# Patient Record
Sex: FEMALE | Race: WHITE | NOT HISPANIC OR LATINO | URBAN - METROPOLITAN AREA
[De-identification: names, ages, dates, MRNs, and addresses within clinical notes are randomized per-mention and may not be internally consistent; named-entity substitution may affect disease eponyms.]

---

## 2021-02-17 ENCOUNTER — E-VISIT (OUTPATIENT)
Dept: FAMILY MEDICINE CLINIC | Facility: TELEHEALTH | Age: 41
End: 2021-02-17

## 2021-02-17 ENCOUNTER — TELEMEDICINE (OUTPATIENT)
Dept: FAMILY MEDICINE CLINIC | Facility: TELEHEALTH | Age: 41
End: 2021-02-17

## 2021-02-17 VITALS — TEMPERATURE: 99 F

## 2021-02-17 DIAGNOSIS — J06.9 UPPER RESPIRATORY TRACT INFECTION, UNSPECIFIED TYPE: Primary | ICD-10-CM

## 2021-02-17 DIAGNOSIS — J32.9 SINUSITIS, UNSPECIFIED CHRONICITY, UNSPECIFIED LOCATION: ICD-10-CM

## 2021-02-17 DIAGNOSIS — H66.91 ACUTE EAR INFECTION, RIGHT: Primary | ICD-10-CM

## 2021-02-17 PROCEDURE — 99203 OFFICE O/P NEW LOW 30 MIN: CPT | Performed by: NURSE PRACTITIONER

## 2021-02-17 PROCEDURE — 99422 OL DIG E/M SVC 11-20 MIN: CPT | Performed by: NURSE PRACTITIONER

## 2021-02-17 RX ORDER — METHYLPREDNISOLONE 4 MG/1
TABLET ORAL
Qty: 1 EACH | Refills: 0 | Status: SHIPPED | OUTPATIENT
Start: 2021-02-17 | End: 2021-04-05

## 2021-02-17 RX ORDER — FLUTICASONE PROPIONATE 50 MCG
2 SPRAY, SUSPENSION (ML) NASAL DAILY
Qty: 18.2 ML | Refills: 0 | Status: SHIPPED | OUTPATIENT
Start: 2021-02-17 | End: 2021-04-05

## 2021-02-17 RX ORDER — AMOXICILLIN AND CLAVULANATE POTASSIUM 875; 125 MG/1; MG/1
1 TABLET, FILM COATED ORAL 2 TIMES DAILY
Qty: 14 TABLET | Refills: 0 | Status: SHIPPED | OUTPATIENT
Start: 2021-02-17 | End: 2021-02-24

## 2021-02-17 NOTE — PATIENT INSTRUCTIONS

## 2021-02-17 NOTE — PATIENT INSTRUCTIONS
Recommend Covid testing. Isolation from others until on antibiotic for 48 hours and fever is down without the use of fever-reducing medication.     Sinusitis, Adult  Sinusitis is soreness and swelling (inflammation) of your sinuses. Sinuses are hollow spaces in the bones around your face. They are located:  · Around your eyes.  · In the middle of your forehead.  · Behind your nose.  · In your cheekbones.  Your sinuses and nasal passages are lined with a fluid called mucus. Mucus drains out of your sinuses. Swelling can trap mucus in your sinuses. This lets germs (bacteria, virus, or fungus) grow, which leads to infection. Most of the time, this condition is caused by a virus.  What are the causes?  This condition is caused by:  · Allergies.  · Asthma.  · Germs.  · Things that block your nose or sinuses.  · Growths in the nose (nasal polyps).  · Chemicals or irritants in the air.  · Fungus (rare).  What increases the risk?  You are more likely to develop this condition if:  · You have a weak body defense system (immune system).  · You do a lot of swimming or diving.  · You use nasal sprays too much.  · You smoke.  What are the signs or symptoms?  The main symptoms of this condition are pain and a feeling of pressure around the sinuses. Other symptoms include:  · Stuffy nose (congestion).  · Runny nose (drainage).  · Swelling and warmth in the sinuses.  · Headache.  · Toothache.  · A cough that may get worse at night.  · Mucus that collects in the throat or the back of the nose (postnasal drip).  · Being unable to smell and taste.  · Being very tired (fatigue).  · A fever.  · Sore throat.  · Bad breath.  How is this diagnosed?  This condition is diagnosed based on:  · Your symptoms.  · Your medical history.  · A physical exam.  · Tests to find out if your condition is short-term (acute) or long-term (chronic). Your doctor may:  ? Check your nose for growths (polyps).  ? Check your sinuses using a tool that has a light  (endoscope).  ? Check for allergies or germs.  ? Do imaging tests, such as an MRI or CT scan.  How is this treated?  Treatment for this condition depends on the cause and whether it is short-term or long-term.  · If caused by a virus, your symptoms should go away on their own within 10 days. You may be given medicines to relieve symptoms. They include:  ? Medicines that shrink swollen tissue in the nose.  ? Medicines that treat allergies (antihistamines).  ? A spray that treats swelling of the nostrils.   ? Rinses that help get rid of thick mucus in your nose (nasal saline washes).  · If caused by bacteria, your doctor may wait to see if you will get better without treatment. You may be given antibiotic medicine if you have:  ? A very bad infection.  ? A weak body defense system.  · If caused by growths in the nose, you may need to have surgery.  Follow these instructions at home:  Medicines  · Take, use, or apply over-the-counter and prescription medicines only as told by your doctor. These may include nasal sprays.  · If you were prescribed an antibiotic medicine, take it as told by your doctor. Do not stop taking the antibiotic even if you start to feel better.  Hydrate and humidify    · Drink enough water to keep your pee (urine) pale yellow.  · Use a cool mist humidifier to keep the humidity level in your home above 50%.  · Breathe in steam for 10-15 minutes, 3-4 times a day, or as told by your doctor. You can do this in the bathroom while a hot shower is running.  · Try not to spend time in cool or dry air.  Rest  · Rest as much as you can.  · Sleep with your head raised (elevated).  · Make sure you get enough sleep each night.  General instructions    · Put a warm, moist washcloth on your face 3-4 times a day, or as often as told by your doctor. This will help with discomfort.  · Wash your hands often with soap and water. If there is no soap and water, use hand .  · Do not smoke. Avoid being around  people who are smoking (secondhand smoke).  · Keep all follow-up visits as told by your doctor. This is important.  Contact a doctor if:  · You have a fever.  · Your symptoms get worse.  · Your symptoms do not get better within 10 days.  Get help right away if:  · You have a very bad headache.  · You cannot stop throwing up (vomiting).  · You have very bad pain or swelling around your face or eyes.  · You have trouble seeing.  · You feel confused.  · Your neck is stiff.  · You have trouble breathing.  Summary  · Sinusitis is swelling of your sinuses. Sinuses are hollow spaces in the bones around your face.  · This condition is caused by tissues in your nose that become inflamed or swollen. This traps germs. These can lead to infection.  · If you were prescribed an antibiotic medicine, take it as told by your doctor. Do not stop taking it even if you start to feel better.  · Keep all follow-up visits as told by your doctor. This is important.  This information is not intended to replace advice given to you by your health care provider. Make sure you discuss any questions you have with your health care provider.  Document Revised: 05/20/2019 Document Reviewed: 05/20/2019  Elsevier Patient Education © 2020 [employer]

## 2021-02-17 NOTE — PROGRESS NOTES
Chief Complaint  Sinusitis (Ear pain, sore throat, fever, chills, headache, bodyaches)    Subjective          Kira Adam presents to [facility]  Sore throat, fever, chills, headache, bodyaches, earpain for a couple of days. She reports living in a group home setting and was exposed to two other females that were reported to have pneumonia. Both started with the same symptoms that she is currently having. There has been no reports of Covid in the facility and she has not been tested since entering the previous facility 2 months ago. Her temperature last night was reported to be 101 but returned to normal with tylenol and alternating with Aleve. She reports chills then sweats when fever comes down. She denies cough or chest congestion.    Sinusitis  This is a new problem. The current episode started in the past 7 days. The problem has been gradually worsening since onset. The maximum temperature recorded prior to her arrival was 101 - 101.9 F. Associated symptoms include chills, ear pain, headaches, sinus pressure, a sore throat and swollen glands. Pertinent negatives include no coughing or shortness of breath. Past treatments include acetaminophen. Improvement on treatment: temp down to normal with tylenol.       Review of Systems   Constitutional: Positive for chills and fever.   HENT: Positive for ear pain, postnasal drip, sinus pressure, sore throat and swollen glands.    Respiratory: Negative for cough, shortness of breath and wheezing.    Cardiovascular: Negative for chest pain.   Musculoskeletal: Positive for myalgias. Negative for gait problem.     Objective   Vital Signs:   Temp 99 °F (37.2 °C) (Oral)     Physical Exam  Constitutional:       General: She is not in acute distress.     Appearance: She is ill-appearing.   HENT:      Nose: Nose normal.   Pulmonary:      Effort: Pulmonary effort is normal.   Neurological:      Mental Status: She is alert.   Psychiatric:          Speech: Speech normal.        Result Review :                 Assessment and Plan    Diagnoses and all orders for this visit:    1. Acute ear infection, right (Primary)    2. Sinusitis, unspecified chronicity, unspecified location  -     amoxicillin-clavulanate (AUGMENTIN) 875-125 MG per tablet; Take 1 tablet by mouth 2 (Two) Times a Day for 7 days.  Dispense: 14 tablet; Refill: 0  -     methylPREDNISolone (MEDROL) 4 MG dose pack; Take as directed on package instructions.  Dispense: 1 each; Refill: 0    Recommend Covid testing. Isolation from others until on antibiotic for 48 hours and fever is down without the use of fever-reducing medication. See patient instructions.      Follow Up   No follow-ups on file.  Patient was given instructions and counseling regarding her condition or for health maintenance advice. Please see specific information pulled into the AVS if appropriate.

## 2021-02-17 NOTE — PROGRESS NOTES
Kira Adam    1980  [id number]    I have reviewed the e-Visit questionnaire and patient's answers, my assessment and plan are as follows:      HPI  Kira Adam is a 40 y.o. with a 1 day history of occasional dry cough, low-grade fever <100.4, nasal congestion, sore throat, swollen glands, headache, ear pain, body aches, fatigue.  She denies shortness of breath, loss of taste or smell    Review of Systems - Negative except symptoms listed in HPI      Diagnoses and all orders for this visit:    1. Upper respiratory tract infection, unspecified type (Primary)  -     Chlorcyclizine-Pseudoephed 25-60 MG tablet; Take 1 tablet by mouth 3 (Three) Times a Day As Needed (nasal congestion).  Dispense: 42 tablet; Refill: 0  -     fluticasone (FLONASE) 50 MCG/ACT nasal spray; 2 sprays into the nostril(s) as directed by provider Daily.  Dispense: 18.2 mL; Refill: 0  -     COVID-19 PCR, [facility], NP SWAB IN [facility]AL TRANSPORT MEDIA 24-30 HR TAT - Swab, Nasopharynx; Future    --URI vs potential COVID-19 infection  --will treat for viral URI and test for COVID  --recommend quarantine x 10 days until symptoms improve/resolve and no fever x 24 hours without fever-reducing medications        Any medications prescribed have been sent electronically to   [id number] - [place], [place] - [address] [place] [address] - [phone]  - [phone] [address] [address]  Phone: [phone] Fax: [phone]      Time Documentation  Counseled patient  Counseling topics: diagnosis, treatment options, return instructions and quarantine/testing  Total encounter time: counseling time more than 50% of visit: 20 minutes        Tabitha Li, ARIES  02/17/21  11:20 EST

## 2021-04-05 ENCOUNTER — LAB (OUTPATIENT)
Dept: LAB | Facility: HOSPITAL | Age: 41
End: 2021-04-05

## 2021-04-05 ENCOUNTER — HOSPITAL ENCOUNTER (OUTPATIENT)
Dept: GENERAL RADIOLOGY | Facility: HOSPITAL | Age: 41
Discharge: HOME | End: 2021-04-05

## 2021-04-05 ENCOUNTER — OFFICE VISIT (OUTPATIENT)
Dept: INTERNAL MEDICINE | Facility: CLINIC | Age: 41
End: 2021-04-05

## 2021-04-05 VITALS
TEMPERATURE: 97.8 F | BODY MASS INDEX: 22.54 KG/M2 | DIASTOLIC BLOOD PRESSURE: 66 MMHG | HEART RATE: 78 BPM | OXYGEN SATURATION: 99 % | SYSTOLIC BLOOD PRESSURE: 100 MMHG | WEIGHT: 143.6 LBS | HEIGHT: 67 IN

## 2021-04-05 DIAGNOSIS — Z11.3 SCREEN FOR STD (SEXUALLY TRANSMITTED DISEASE): ICD-10-CM

## 2021-04-05 DIAGNOSIS — R22.1 NECK MASS: ICD-10-CM

## 2021-04-05 DIAGNOSIS — K04.7 DENTAL INFECTION: ICD-10-CM

## 2021-04-05 DIAGNOSIS — R61 NIGHT SWEATS: Primary | ICD-10-CM

## 2021-04-05 DIAGNOSIS — G89.29 CHRONIC PAIN OF LEFT ANKLE: ICD-10-CM

## 2021-04-05 DIAGNOSIS — Z13.1 SCREENING FOR DIABETES MELLITUS: ICD-10-CM

## 2021-04-05 DIAGNOSIS — R61 NIGHT SWEATS: ICD-10-CM

## 2021-04-05 DIAGNOSIS — M25.572 CHRONIC PAIN OF LEFT ANKLE: ICD-10-CM

## 2021-04-05 DIAGNOSIS — Z13.220 SCREENING CHOLESTEROL LEVEL: ICD-10-CM

## 2021-04-05 DIAGNOSIS — Z12.4 SCREENING FOR MALIGNANT NEOPLASM OF CERVIX: ICD-10-CM

## 2021-04-05 LAB
ALBUMIN SERPL-MCNC: 4.1 G/DL (ref 3.5–5.2)
ALBUMIN/GLOB SERPL: 1.7 G/DL
ALP SERPL-CCNC: 57 U/L (ref 39–117)
ALT SERPL W P-5'-P-CCNC: 18 U/L (ref 1–33)
ANION GAP SERPL CALCULATED.3IONS-SCNC: 9.2 MMOL/L (ref 5–15)
AST SERPL-CCNC: 18 U/L (ref 1–32)
B-HCG UR QL: NEGATIVE
BASOPHILS # BLD AUTO: 0.05 10*3/MM3 (ref 0–0.2)
BASOPHILS NFR BLD AUTO: 0.8 % (ref 0–1.5)
BILIRUB BLD-MCNC: NEGATIVE MG/DL
BILIRUB SERPL-MCNC: <0.2 MG/DL (ref 0–1.2)
BUN SERPL-MCNC: 12 MG/DL (ref 6–20)
BUN/CREAT SERPL: 19 (ref 7–25)
CALCIUM SPEC-SCNC: 8.9 MG/DL (ref 8.6–10.5)
CHLORIDE SERPL-SCNC: 105 MMOL/L (ref 98–107)
CHOLEST SERPL-MCNC: 130 MG/DL (ref 0–200)
CLARITY, POC: ABNORMAL
CO2 SERPL-SCNC: 28.8 MMOL/L (ref 22–29)
COLOR UR: YELLOW
CREAT SERPL-MCNC: 0.63 MG/DL (ref 0.57–1)
DEPRECATED RDW RBC AUTO: 42.2 FL (ref 37–54)
EOSINOPHIL # BLD AUTO: 0.1 10*3/MM3 (ref 0–0.4)
EOSINOPHIL NFR BLD AUTO: 1.5 % (ref 0.3–6.2)
ERYTHROCYTE [DISTWIDTH] IN BLOOD BY AUTOMATED COUNT: 12.8 % (ref 12.3–15.4)
GFR SERPL CREATININE-BSD FRML MDRD: 106 ML/MIN/1.73
GLOBULIN UR ELPH-MCNC: 2.4 GM/DL
GLUCOSE SERPL-MCNC: 71 MG/DL (ref 65–99)
GLUCOSE UR STRIP-MCNC: NEGATIVE MG/DL
HBA1C MFR BLD: 5.25 % (ref 4.8–5.6)
HCT VFR BLD AUTO: 40.9 % (ref 34–46.6)
HDLC SERPL-MCNC: 52 MG/DL (ref 40–60)
HGB BLD-MCNC: 13.4 G/DL (ref 12–15.9)
IMM GRANULOCYTES # BLD AUTO: 0.02 10*3/MM3 (ref 0–0.05)
IMM GRANULOCYTES NFR BLD AUTO: 0.3 % (ref 0–0.5)
INTERNAL NEGATIVE CONTROL: NEGATIVE
INTERNAL POSITIVE CONTROL: POSITIVE
KETONES UR QL: NEGATIVE
LDLC SERPL CALC-MCNC: 60 MG/DL (ref 0–100)
LDLC/HDLC SERPL: 1.13 {RATIO}
LEUKOCYTE EST, POC: NEGATIVE
LYMPHOCYTES # BLD AUTO: 2.68 10*3/MM3 (ref 0.7–3.1)
LYMPHOCYTES NFR BLD AUTO: 40.8 % (ref 19.6–45.3)
Lab: NORMAL
MCH RBC QN AUTO: 29.5 PG (ref 26.6–33)
MCHC RBC AUTO-ENTMCNC: 32.8 G/DL (ref 31.5–35.7)
MCV RBC AUTO: 90.1 FL (ref 79–97)
MONOCYTES # BLD AUTO: 0.49 10*3/MM3 (ref 0.1–0.9)
MONOCYTES NFR BLD AUTO: 7.5 % (ref 5–12)
NEUTROPHILS NFR BLD AUTO: 3.23 10*3/MM3 (ref 1.7–7)
NEUTROPHILS NFR BLD AUTO: 49.1 % (ref 42.7–76)
NITRITE UR-MCNC: NEGATIVE MG/ML
NRBC BLD AUTO-RTO: 0 /100 WBC (ref 0–0.2)
PH UR: 6.5 [PH] (ref 5–8)
PLATELET # BLD AUTO: 376 10*3/MM3 (ref 140–450)
PMV BLD AUTO: 10.1 FL (ref 6–12)
POTASSIUM SERPL-SCNC: 4.7 MMOL/L (ref 3.5–5.2)
PROT SERPL-MCNC: 6.5 G/DL (ref 6–8.5)
PROT UR STRIP-MCNC: NEGATIVE MG/DL
RBC # BLD AUTO: 4.54 10*6/MM3 (ref 3.77–5.28)
RBC # UR STRIP: ABNORMAL /UL
SODIUM SERPL-SCNC: 143 MMOL/L (ref 136–145)
SP GR UR: 1.01 (ref 1–1.03)
TRIGL SERPL-MCNC: 95 MG/DL (ref 0–150)
TSH SERPL DL<=0.05 MIU/L-ACNC: 0.58 UIU/ML (ref 0.27–4.2)
UROBILINOGEN UR QL: NORMAL
VLDLC SERPL-MCNC: 18 MG/DL (ref 5–40)
WBC # BLD AUTO: 6.57 10*3/MM3 (ref 3.4–10.8)

## 2021-04-05 PROCEDURE — 87798 DETECT AGENT NOS DNA AMP: CPT | Performed by: PHYSICIAN ASSISTANT

## 2021-04-05 PROCEDURE — 36415 COLL VENOUS BLD VENIPUNCTURE: CPT

## 2021-04-05 PROCEDURE — 81025 URINE PREGNANCY TEST: CPT | Performed by: PHYSICIAN ASSISTANT

## 2021-04-05 PROCEDURE — 80050 GENERAL HEALTH PANEL: CPT | Performed by: PHYSICIAN ASSISTANT

## 2021-04-05 PROCEDURE — 86803 HEPATITIS C AB TEST: CPT | Performed by: PHYSICIAN ASSISTANT

## 2021-04-05 PROCEDURE — 87661 TRICHOMONAS VAGINALIS AMPLIF: CPT | Performed by: PHYSICIAN ASSISTANT

## 2021-04-05 PROCEDURE — 87801 DETECT AGNT MULT DNA AMPLI: CPT | Performed by: PHYSICIAN ASSISTANT

## 2021-04-05 PROCEDURE — 87491 CHLMYD TRACH DNA AMP PROBE: CPT | Performed by: PHYSICIAN ASSISTANT

## 2021-04-05 PROCEDURE — 80061 LIPID PANEL: CPT | Performed by: PHYSICIAN ASSISTANT

## 2021-04-05 PROCEDURE — 99214 OFFICE O/P EST MOD 30 MIN: CPT | Performed by: PHYSICIAN ASSISTANT

## 2021-04-05 PROCEDURE — 86592 SYPHILIS TEST NON-TREP QUAL: CPT | Performed by: PHYSICIAN ASSISTANT

## 2021-04-05 PROCEDURE — G0432 EIA HIV-1/HIV-2 SCREEN: HCPCS | Performed by: PHYSICIAN ASSISTANT

## 2021-04-05 PROCEDURE — 73610 X-RAY EXAM OF ANKLE: CPT

## 2021-04-05 PROCEDURE — 83036 HEMOGLOBIN GLYCOSYLATED A1C: CPT | Performed by: PHYSICIAN ASSISTANT

## 2021-04-05 PROCEDURE — 87591 N.GONORRHOEAE DNA AMP PROB: CPT | Performed by: PHYSICIAN ASSISTANT

## 2021-04-05 RX ORDER — MULTIPLE VITAMINS W/ MINERALS TAB 9MG-400MCG
1 TAB ORAL DAILY
COMMUNITY

## 2021-04-05 RX ORDER — AMOXICILLIN 875 MG/1
875 TABLET, COATED ORAL 2 TIMES DAILY
Qty: 20 TABLET | Refills: 0 | Status: SHIPPED | OUTPATIENT
Start: 2021-04-05 | End: 2021-06-21

## 2021-04-05 RX ORDER — CHOLECALCIFEROL (VITAMIN D3) 125 MCG
10 CAPSULE ORAL NIGHTLY
COMMUNITY
End: 2021-07-09

## 2021-04-05 NOTE — PROGRESS NOTES
Chief Complaint  Establish Care (new patient), Joint Swelling (may need a referral to ortho), Mass (has a lump in her throat on left side), Night Sweats (has night sweats sx x 2 weeks), and lab (would like labs today)    Subjective          History of Present Illness  Kira Adam presents to [facility] to establish care.  Was living in [place] but moved here 2 mo ago to be in [facility] for long term program. Hx of drug abuse but no IV drug abuse.   Is here for blood work and STD screen, had neg STD screen 2 months ago after possible exposure to syphilis.  Everything was negative. No current signs of stds, no sores/rashes, no vaginal discharge, is on her period currently.    Mass in neck:  Has been there for almost 20 years, no symptoms. She has seen a dr before for it and they said was not a goiter. She has always had normal thyroid level.  She does not have any trouble swallowing or pain with it.  Does not feel like it has gotten larger.     Night sweats:  No weight loss or fevers, wakes up soaked a few times a week for the last few weeks. Not sure if has an infection, teeth are in bad shape, has a fear of the dentist.  Is going to be seeing a dentist soon as part of the program she is in.  Attributes it to possible nightmares.  Is not interested in being on any kind of medication for sleep or nightmares.     Foot Pain:  Left talus fracture over 4 years ago.  She had surgery but has had recurrent ankle swelling and pain since then.  Was told one of the screws was working its way out.  She does have a lot of varicose veins on that area and intermittent swelling of her leg over the last several years, worse with activity.  Does decrease at night.  Does not have significant pain or swelling in her calf, it is mostly just around her left ankle where she had the injury.         Review of Systems   Constitutional: Negative for chills, fatigue, fever and unexpected weight loss.    HENT: Positive for dental problem. Negative for congestion, hearing loss, rhinorrhea, sore throat and trouble swallowing.    Eyes: Negative for visual disturbance.   Respiratory: Negative for cough, shortness of breath and wheezing.    Cardiovascular: Positive for leg swelling. Negative for chest pain and palpitations.   Gastrointestinal: Negative for abdominal pain, blood in stool, constipation, diarrhea, nausea and indigestion.   Endocrine: Negative for polydipsia and polyuria.   Genitourinary: Negative for breast discharge, dysuria, frequency and urgency.   Musculoskeletal: Positive for joint swelling. Negative for arthralgias, back pain and myalgias.   Skin: Positive for color change. Negative for rash.   Neurological: Positive for headache. Negative for dizziness, seizures, syncope and confusion.   Hematological: Does not bruise/bleed easily.   Psychiatric/Behavioral: Negative for sleep disturbance, suicidal ideas and depressed mood. The patient is not nervous/anxious.        The following portions of the patient's history were reviewed and updated as appropriate: allergies, current medications, past family history, past medical history, past social history, past surgical history and problem list.    No Known Allergies  Current Outpatient Medications on File Prior to Visit   Medication Sig Dispense Refill   • CALCIUM PO Take 2 each by mouth Daily.     • diphenhydrAMINE HCl (BENADRYL ALLERGY PO) Take 1 tablet by mouth Every 72 (Seventy-Two) Hours.     • melatonin 5 MG tablet tablet Take 10 mg by mouth Every Night.     • multivitamin with minerals (MULTIVITAMIN ADULT PO) Take 1 tablet by mouth Daily.       No current facility-administered medications on file prior to visit.     New Medications Ordered This Visit   Medications   • amoxicillin (AMOXIL) 875 MG tablet     Sig: Take 1 tablet by mouth 2 (Two) Times a Day.     Dispense:  20 tablet     Refill:  0       Past Medical History:   Diagnosis Date   • Broken  "ankle 2017    left leg   • Pap smear for cervical cancer screening       Past Surgical History:   Procedure Laterality Date   •  SECTION      3 times   • LEG SURGERY  2017    [place] bone restruction      Family History   Problem Relation Age of Onset   • Diabetes Maternal Grandmother       Social History     Socioeconomic History   • Marital status: Single     Spouse name: Not on file   • Number of children: Not on file   • Years of education: Not on file   • Highest education level: Not on file   Tobacco Use   • Smoking status: Current Every Day Smoker     Types: Cigarettes     Start date:    • Smokeless tobacco: Never Used   • Tobacco comment: 6 cigs a day   Substance and Sexual Activity   • Alcohol use: Never   • Drug use: Not Currently   • Sexual activity: Defer        Objective   Vital Signs:   Vitals:    21 0932   BP: 100/66   BP Location: Left arm   Patient Position: Sitting   Pulse: 78   Temp: 97.8 °F (36.6 °C)   TempSrc: Infrared   SpO2: 99%   Weight: 65.1 kg (143 lb 9.6 oz)   Height: 170.2 cm (67\")      Body mass index is 22.49 kg/m².  Physical Exam  Vitals reviewed.   Constitutional:       General: She is not in acute distress.     Appearance: Normal appearance.   HENT:      Head: Normocephalic and atraumatic.      Mouth/Throat:      Mouth: Mucous membranes are moist.      Dentition: Abnormal dentition. Dental caries present.      Pharynx: No oropharyngeal exudate or posterior oropharyngeal erythema.      Comments: Multiple dental caries and decay noted  Eyes:      General: No scleral icterus.     Extraocular Movements: Extraocular movements intact.      Conjunctiva/sclera: Conjunctivae normal.   Neck:      Comments: Anterior soft/mobile neck mass, 1cm  Cardiovascular:      Rate and Rhythm: Normal rate and regular rhythm.      Heart sounds: Normal heart sounds. No murmur heard.     Pulmonary:      Effort: Pulmonary effort is normal. No respiratory distress.      Breath sounds: Normal " breath sounds. No stridor. No wheezing or rhonchi.   Abdominal:      General: Bowel sounds are normal. There is no distension.      Palpations: Abdomen is soft. There is no mass.      Tenderness: There is no abdominal tenderness. There is no right CVA tenderness, left CVA tenderness, guarding or rebound.   Musculoskeletal:         General: Swelling and signs of injury present. Normal range of motion.      Cervical back: Normal range of motion and neck supple. No tenderness.      Right lower leg: No edema.      Left lower leg: Edema present.   Skin:     General: Skin is warm and dry.      Coloration: Skin is not jaundiced.   Neurological:      General: No focal deficit present.      Mental Status: She is alert and oriented to person, place, and time.      Gait: Gait normal.   Psychiatric:         Mood and Affect: Mood normal.         Behavior: Behavior normal.          Result Review :              Results for orders placed or performed in visit on 04/05/21   POC Urinalysis Dipstick, Automated    Specimen: Urine   Result Value Ref Range    Color Yellow Yellow, Straw, Dark Yellow, Soni    Clarity, UA Hazy (A) Clear    Specific Gravity  1.010 1.005 - 1.030    pH, Urine 6.5 5.0 - 8.0    Leukocytes Negative Negative    Nitrite, UA Negative Negative    Protein, POC Negative Negative mg/dL    Glucose, UA Negative Negative, 1000 mg/dL (3+) mg/dL    Ketones, UA Negative Negative    Urobilinogen, UA Normal Normal    Bilirubin Negative Negative    Blood, UA 3+ (A) Negative   POC Pregnancy, Urine    Specimen: Urine   Result Value Ref Range    HCG, Urine, QL Negative Negative    Lot Number [id number]     Internal Positive Control Positive     Internal Negative Control Negative      .     Assessment and Plan    Diagnoses and all orders for this visit:    1. Night sweats (Primary)  Assessment & Plan:  Check labs. Monitor for fevers or wt loss. F/u if sx worsen or has new sx.    Orders:  -     Comprehensive Metabolic Panel; Future  -      CBC Auto Differential; Future  -     POC Urinalysis Dipstick, Automated  -     Cancel: hCG, Quantitative, Pregnancy; Future  -     POC Pregnancy, Urine    2. Dental infection  Assessment & Plan:  Follow up with dentist, tx w/amox    Orders:  -     amoxicillin (AMOXIL) 875 MG tablet; Take 1 tablet by mouth 2 (Two) Times a Day.  Dispense: 20 tablet; Refill: 0    3. Neck mass  Assessment & Plan:  Order u/s, non emergent, check labs    Orders:  -     TSH Rfx On Abnormal To Free T4; Future  -     US Head Neck Soft Tissue; Future    4. Chronic pain of left ankle  Assessment & Plan:  Xray lft ankle, refer to ortho    Orders:  -     Ambulatory Referral to Orthopedic Surgery  -     XR Ankle 3+ View Left; Future    5. Screen for STD (sexually transmitted disease)  -     RPR; Future  -     HIV-1/O/2 Ag/Ab w Reflex; Future  -     Hepatitis C Antibody; Future  -     NuSwab VG+ - Swab, Vagina; Future    6. Screening for diabetes mellitus  -     Hemoglobin A1c; Future    7. Screening cholesterol level  -     Lipid Panel; Future    8. Screening for malignant neoplasm of cervix  -     Ambulatory Referral to Gynecology        Follow Up   Return in about 4 weeks (around 5/3/2021) for night sweats, neck mass.    Follow up if symptoms worsen or persist or has new or concerning symptoms, go to ER for severe symptoms.   Reviewed common medication effects and side effects and to report side effects immediately, the patient expressed good understanding.  Encouraged medication compliance and the importance of keeping scheduled follow up appointments with me and any other providers  If labs or images are ordered we will contact you with the results within the next week.  If you have not heard from us after a week please call our office to inquire about the results.   Patient was given instructions and counseling regarding her condition or for health maintenance advice. Please see specific information pulled into the AVS if appropriate.      Maude Og PA-C    * Please note that portions of this note may have been completed with a voice recognition program. Efforts were made to edit the dictation but occasionally words are erroneously transcribed.

## 2021-04-06 LAB
HCV AB SER DONR QL: NORMAL
HIV1+2 AB SER QL: NORMAL
RPR SER QL: NORMAL

## 2021-04-07 ENCOUNTER — TELEPHONE (OUTPATIENT)
Dept: INTERNAL MEDICINE | Facility: CLINIC | Age: 41
End: 2021-04-07

## 2021-04-07 NOTE — TELEPHONE ENCOUNTER
----- Message from Maude Og PA-C sent at 4/6/2021 10:04 PM EDT -----  Labs all look good. STD screening all negative. Ankle Xray looked good as well, it says the screws are in place. Should follow up with ortho for ankle pain (referral placed)

## 2021-04-08 ENCOUNTER — TELEPHONE (OUTPATIENT)
Dept: INTERNAL MEDICINE | Facility: CLINIC | Age: 41
End: 2021-04-08

## 2021-04-08 LAB
A VAGINAE DNA VAG QL NAA+PROBE: ABNORMAL SCORE
BVAB2 DNA VAG QL NAA+PROBE: ABNORMAL SCORE
C ALBICANS DNA VAG QL NAA+PROBE: NEGATIVE
C GLABRATA DNA VAG QL NAA+PROBE: NEGATIVE
C TRACH DNA VAG QL NAA+PROBE: NEGATIVE
MEGA1 DNA VAG QL NAA+PROBE: ABNORMAL SCORE
N GONORRHOEA DNA VAG QL NAA+PROBE: NEGATIVE
T VAGINALIS DNA VAG QL NAA+PROBE: NEGATIVE

## 2021-04-09 ENCOUNTER — DOCUMENTATION (OUTPATIENT)
Dept: INTERNAL MEDICINE | Facility: CLINIC | Age: 41
End: 2021-04-09

## 2021-04-09 RX ORDER — METRONIDAZOLE 500 MG/1
500 TABLET ORAL 2 TIMES DAILY
Qty: 14 TABLET | Refills: 0 | Status: SHIPPED | OUTPATIENT
Start: 2021-04-09 | End: 2021-04-16

## 2021-04-12 ENCOUNTER — TELEPHONE (OUTPATIENT)
Dept: INTERNAL MEDICINE | Facility: CLINIC | Age: 41
End: 2021-04-12

## 2021-04-12 NOTE — TELEPHONE ENCOUNTER
----- Message from Maude Og PA-C sent at 4/9/2021  8:45 PM EDT -----  Nuswab neg for STDs, did show bacterial vaginosis. I will send in flagyl

## 2021-04-13 NOTE — TELEPHONE ENCOUNTER
Caller: KoryKira chung    Relationship: Self    Best call back number: [phone]    Medication needed:   Requested Prescriptions     Pending Prescriptions Disp Refills   • metroNIDAZOLE (Flagyl) 500 MG tablet 14 tablet 0     Sig: Take 1 tablet by mouth 2 (Two) Times a Day for 7 days.       When do you need the refill by: 04/13/21    What additional details did the patient provide when requesting the medication: PATIENT WAS INFORMED YESTERDAY THAT THE MEDICATION WOULD BE SENT INTO THE PHARMACY. WHEN SHE CONTACTED THEM, THEY SAID THEY NEVER GOT THE SCRIPT. PATIENT IS ASKING TO BE CONTACTED BACK.     Does the patient have less than a 3 day supply:  [x] Yes  [] No    What is the patient's preferred pharmacy: [id number] [place], [place] - [address] [place] [address] [phone] [place] [phone]

## 2021-04-14 RX ORDER — METRONIDAZOLE 500 MG/1
500 TABLET ORAL 2 TIMES DAILY
Qty: 14 TABLET | Refills: 0 | OUTPATIENT
Start: 2021-04-14 | End: 2021-04-21

## 2021-04-14 NOTE — TELEPHONE ENCOUNTER
S/W pharmacy techAshley, who states the medication [facility] [place]. Pharmacy Tech states she spoke with the manager who states the medicaiton was delivered to the patient.     Medication refill denied.

## 2021-04-20 ENCOUNTER — APPOINTMENT (OUTPATIENT)
Dept: ULTRASOUND IMAGING | Facility: HOSPITAL | Age: 41
End: 2021-04-20

## 2021-04-27 ENCOUNTER — APPOINTMENT (OUTPATIENT)
Dept: ULTRASOUND IMAGING | Facility: HOSPITAL | Age: 41
End: 2021-04-27

## 2021-05-11 ENCOUNTER — HOSPITAL ENCOUNTER (OUTPATIENT)
Dept: ULTRASOUND IMAGING | Facility: HOSPITAL | Age: 41
Discharge: HOME | End: 2021-05-11
Admitting: PHYSICIAN ASSISTANT

## 2021-05-11 DIAGNOSIS — R22.1 NECK MASS: ICD-10-CM

## 2021-05-11 PROCEDURE — 76536 US EXAM OF HEAD AND NECK: CPT

## 2021-05-12 ENCOUNTER — TELEPHONE (OUTPATIENT)
Dept: INTERNAL MEDICINE | Facility: CLINIC | Age: 41
End: 2021-05-12

## 2021-05-12 DIAGNOSIS — E04.1 THYROID NODULE: ICD-10-CM

## 2021-05-12 DIAGNOSIS — R22.1 NECK MASS: ICD-10-CM

## 2021-05-12 DIAGNOSIS — R61 NIGHT SWEATS: Primary | ICD-10-CM

## 2021-05-12 NOTE — TELEPHONE ENCOUNTER
Pn called pt lft vm per TIMOTEO       -- Message from Maude Og PA-C sent at 5/12/2021  8:36 AM EDT -----  Your neck ultrasound showed several benign small thyroid cysts and 1 concerning nodule that should be biopsied. I will set up an appointment with a specialist for this. If you do not hear about an appointment in the next week please call our office

## 2021-05-12 NOTE — PROGRESS NOTES
Your neck ultrasound showed several benign small thyroid cysts and 1 concerning nodule that should be biopsied. I will set up an appointment with a specialist for this. If you do not hear about an appointment in the next week please call our office

## 2021-05-12 NOTE — TELEPHONE ENCOUNTER
----- Message from Muade Og PA-C sent at 5/12/2021  8:36 AM EDT -----  Your neck ultrasound showed several benign small thyroid cysts and 1 concerning nodule that should be biopsied. I will set up an appointment with a specialist for this. If you do not hear about an appointment in the next week please call our office

## 2021-05-19 DIAGNOSIS — E04.1 THYROID NODULE: ICD-10-CM

## 2021-05-19 DIAGNOSIS — R22.1 NECK MASS: ICD-10-CM

## 2021-05-19 DIAGNOSIS — R61 NIGHT SWEATS: Primary | ICD-10-CM

## 2021-05-25 ENCOUNTER — TRANSCRIBE ORDERS (OUTPATIENT)
Dept: ADMINISTRATIVE | Facility: HOSPITAL | Age: 41
End: 2021-05-25

## 2021-05-25 DIAGNOSIS — E04.1 LEFT THYROID NODULE: Primary | ICD-10-CM

## 2021-06-01 ENCOUNTER — APPOINTMENT (OUTPATIENT)
Dept: ULTRASOUND IMAGING | Facility: HOSPITAL | Age: 41
End: 2021-06-01

## 2021-06-01 ENCOUNTER — HOSPITAL ENCOUNTER (OUTPATIENT)
Dept: ULTRASOUND IMAGING | Facility: HOSPITAL | Age: 41
Discharge: HOME | End: 2021-06-01
Admitting: SURGERY

## 2021-06-01 DIAGNOSIS — E04.1 LEFT THYROID NODULE: ICD-10-CM

## 2021-06-01 PROCEDURE — 76942 ECHO GUIDE FOR BIOPSY: CPT

## 2021-06-01 PROCEDURE — 88173 CYTOPATH EVAL FNA REPORT: CPT | Performed by: SURGERY

## 2021-06-01 PROCEDURE — 10005 FNA BX W/US GDN 1ST LES: CPT | Performed by: RADIOLOGY

## 2021-06-01 RX ORDER — LIDOCAINE HYDROCHLORIDE 10 MG/ML
5 INJECTION, SOLUTION EPIDURAL; INFILTRATION; INTRACAUDAL; PERINEURAL ONCE
Status: COMPLETED | OUTPATIENT
Start: 2021-06-01 | End: 2021-06-01

## 2021-06-01 RX ADMIN — LIDOCAINE HYDROCHLORIDE 5 ML: 10 INJECTION, SOLUTION EPIDURAL; INFILTRATION; INTRACAUDAL; PERINEURAL at 10:15

## 2021-06-01 NOTE — POST-PROCEDURE NOTE
Interventional Radiology Operative Note    Date: 06/01/21     Time: 12:36 EDT     Pre-op Diagnosis: Thyroid nodule.  Post-op Diagnosis: same    Procedure: US guided left thyroid biopsy.    Surgeon: Bo Tristan M.D.  Assistants: None    Sedation: None     Estimated Blood Loss (EBL): Trace     Urine Output (UOP): N/A (short procedure)    IVF: N/A (short procedure)    Findings: Uneventful     Specimens: 4 FNA passes.    Complications: None.     Disposition: Home or self care.

## 2021-06-03 ENCOUNTER — TELEPHONE (OUTPATIENT)
Dept: INTERNAL MEDICINE | Facility: CLINIC | Age: 41
End: 2021-06-03

## 2021-06-03 LAB
LAB AP CASE REPORT: NORMAL
PATH REPORT.FINAL DX SPEC: NORMAL

## 2021-06-03 NOTE — TELEPHONE ENCOUNTER
----- Message from Maude Og PA-C sent at 6/3/2021  3:08 PM EDT -----  Please let her know the thyroid biopsy was normal/benign, she does not have malignant cells.

## 2021-06-21 RX ORDER — AMOXICILLIN 875 MG/1
875 TABLET, COATED ORAL 2 TIMES DAILY
Qty: 20 TABLET | Refills: 0 | Status: SHIPPED | OUTPATIENT
Start: 2021-06-21 | End: 2021-07-09

## 2021-06-21 RX ORDER — NAPROXEN 250 MG/1
TABLET ORAL
Qty: 60 TABLET | Refills: 0 | Status: SHIPPED | OUTPATIENT
Start: 2021-06-21 | End: 2021-07-09

## 2021-07-09 ENCOUNTER — OFFICE VISIT (OUTPATIENT)
Dept: ORTHOPEDIC SURGERY | Facility: CLINIC | Age: 41
End: 2021-07-09

## 2021-07-09 VITALS
SYSTOLIC BLOOD PRESSURE: 110 MMHG | DIASTOLIC BLOOD PRESSURE: 55 MMHG | BODY MASS INDEX: 22.53 KG/M2 | WEIGHT: 143.52 LBS | HEIGHT: 67 IN | HEART RATE: 80 BPM

## 2021-07-09 DIAGNOSIS — M19.072 ARTHRITIS OF ANKLE OR FOOT, DEGENERATIVE, LEFT: ICD-10-CM

## 2021-07-09 DIAGNOSIS — I83.892 VARICOSE VEINS OF LEG WITH EDEMA, LEFT: ICD-10-CM

## 2021-07-09 DIAGNOSIS — M25.572 LEFT ANKLE PAIN, UNSPECIFIED CHRONICITY: Primary | ICD-10-CM

## 2021-07-09 PROCEDURE — 99213 OFFICE O/P EST LOW 20 MIN: CPT | Performed by: PHYSICIAN ASSISTANT

## 2021-07-09 RX ORDER — TRAZODONE HYDROCHLORIDE 50 MG/1
TABLET ORAL
COMMUNITY
Start: 2021-05-04

## 2021-07-09 RX ORDER — PRAZOSIN HYDROCHLORIDE 1 MG/1
CAPSULE ORAL
COMMUNITY
Start: 2021-04-22

## 2021-07-09 NOTE — PROGRESS NOTES
[facility] Note        Subjective     Pain of the Left Ankle      HPI    Kira Adam is a 40 y.o. female.  This is a very pleasant patient presenting today to discuss her left foot and ankle pain.  She is status post R talus fracture in 2017.  She reports pain with weightbearing and walking is 3/10 and aching and throbbing.  She reports decreased range of motion.  She reports some mild swelling.  She has pain with increased weightbearing and walking.  She is unable to wear heels with increased pain.  She has treated with anti-inflammatories for further evaluation treatment patient.    Past Medical History:   Diagnosis Date   • Broken ankle 2017    left leg   • Pap smear for cervical cancer screening       Past Surgical History:   Procedure Laterality Date   •  SECTION      3 times   • LEG SURGERY  2017    [place] bone restruction      Family History   Problem Relation Age of Onset   • Diabetes Maternal Grandmother      Social History     Socioeconomic History   • Marital status: Single     Spouse name: Not on file   • Number of children: Not on file   • Years of education: Not on file   • Highest education level: Not on file   Tobacco Use   • Smoking status: Current Every Day Smoker     Types: Cigarettes     Start date:    • Smokeless tobacco: Never Used   • Tobacco comment: 6 cigs a day   Substance and Sexual Activity   • Alcohol use: Never   • Drug use: Not Currently   • Sexual activity: Defer      Current Outpatient Medications on File Prior to Visit   Medication Sig Dispense Refill   • CALCIUM PO Take 2 each by mouth Daily.     • diphenhydrAMINE HCl (BENADRYL ALLERGY PO) Take 1 tablet by mouth Every 72 (Seventy-Two) Hours.     • multivitamin with minerals (MULTIVITAMIN ADULT PO) Take 1 tablet by mouth Daily.     • prazosin (MINIPRESS) 1 MG capsule      • traZODone (DESYREL) 50 MG tablet        No current facility-administered medications on file prior to  "visit.      No Known Allergies       Review of Systems   Constitutional: Negative.    HENT: Negative.    Eyes: Negative.    Respiratory: Negative.    Cardiovascular: Negative.    Gastrointestinal: Negative.    Endocrine: Negative.    Genitourinary: Negative.    Musculoskeletal: Positive for arthralgias.   Skin: Negative.    Allergic/Immunologic: Negative.    Neurological: Negative.    Hematological: Negative.    Psychiatric/Behavioral: Negative.         I reviewed the patient's chief complaint, history of present illness, review of systems, past medical history, surgical history, family history, social history, medications and allergy list.        Objective      Physical Exam  /55   Pulse 80   Ht 170.2 cm (67.01\")   Wt 65.1 kg (143 lb 8.3 oz)   BMI 22.47 kg/m²     Body mass index is 22.47 kg/m².    General  Mental Status - alert  General Appearance - cooperative, well groomed, not in acute distress  Orientation - Oriented X3  Build & Nutrition - well developed and well nourished  Posture - normal posture  Gait - normal gait       Ortho Exam  V:  Dorsalis Pedis:  Right: 2+; Left:2+    Posterior Tibial: Right:2+; Left:2+    Capillary Refill:  Brisk  MSK:      Tibia:  Right:  non tender; Left:  non tender      Ankle:  Right: non tender; Left:  tender Patient is tender over the anterior ankle joint, subtalar joint. Mild swelling with varicose veins.  Decreased hindfoot eversion and inversion as expected.  Reasonable dorsiflexion plantarflexion.  5/5 motor strength.      Foot:  Right:  non tender; Left:  non tender      NEURO: Heel Walking:  Right:  normal; Left:  normal    Toe Walking:  Right:  normal; Left:  normal     [place]-Amaya 5.07 monofilament test: not evaluated    Lower extremity sensation: intact     Calf Atrophy:none    Motor Function: all 5/5            Assessment    Assessment:  1. Left ankle pain, unspecified chronicity    2. Arthritis of ankle or foot, degenerative, left    3. Varicose veins " of leg with edema, left          Plan:  1. Recommend over-the-counter medication as needed for discomfort  2. Posttraumatic left ankle arthritis.  Reviewed today's x-rays and clinical findings with the patient.  X-rays today show  multiple screws in the talus, suggesting healed talar neck fracture, some anterior osteophytes, some narrowing of the posterior subtalar joint, some narrowing of medial lateral joint space, dorsal joint space still preserved, no definite avascular necrosis of the talus, no acute fractures, compared to nonweightbearing films 4/5/2021.  Foot x-rays show significant arthritic change and irregularity of the second MTPJ, mild hallux valgus metatarsus primus varus, no comparison  We discussed treatment options including custom orthotics, topical and oral anti-inflammatories, injection and very last resort surgery with fusion.  Again the patient prescription for custom orthotics.  She will return to see us as needed.  3. I explained edema and venous stasis to the patient, and the often hereditary nature of the problem, and the contribution of weight, trauma, etc. I explained how they cause edema (due to gravity, etc) I explained how they can lead to ulceration.  I explained how they can cause pain, stiffness, aching, cramping, etc. I explained that it is a very very common problem, so common that even [employer] compression stockings.  I recommend wearing support stockings (compression stockings) daily, we discussed what type and where to find them    Patient history, diagnosis and treatment plan discussed with Dr. Cabrera.          Suzy Alfaro PA-C  07/13/21  08:36 EDT

## 2021-08-11 RX ORDER — NICOTINE 21 MG/24HR
1 PATCH, TRANSDERMAL 24 HOURS TRANSDERMAL EVERY 24 HOURS
Qty: 42 EACH | Refills: 0 | Status: SHIPPED | OUTPATIENT
Start: 2021-08-11

## 2021-12-01 DIAGNOSIS — D22.9 CHANGE IN MOLE: Primary | ICD-10-CM

## 2021-12-07 PROCEDURE — 87070 CULTURE OTHR SPECIMN AEROBIC: CPT | Performed by: NURSE PRACTITIONER

## 2021-12-07 PROCEDURE — 87205 SMEAR GRAM STAIN: CPT | Performed by: NURSE PRACTITIONER

## 2022-04-21 ENCOUNTER — TELEPHONE (OUTPATIENT)
Dept: INTERNAL MEDICINE | Facility: CLINIC | Age: 42
End: 2022-04-21